# Patient Record
Sex: FEMALE | Race: WHITE | NOT HISPANIC OR LATINO | URBAN - METROPOLITAN AREA
[De-identification: names, ages, dates, MRNs, and addresses within clinical notes are randomized per-mention and may not be internally consistent; named-entity substitution may affect disease eponyms.]

---

## 2020-10-07 ENCOUNTER — TELEPHONE ENCOUNTER (OUTPATIENT)
Dept: URBAN - METROPOLITAN AREA CLINIC 54 | Facility: CLINIC | Age: 85
End: 2020-10-07

## 2020-10-09 ENCOUNTER — WEB ENCOUNTER (OUTPATIENT)
Dept: URBAN - METROPOLITAN AREA CLINIC 54 | Facility: CLINIC | Age: 85
End: 2020-10-09

## 2020-10-09 ENCOUNTER — OFFICE VISIT (OUTPATIENT)
Dept: URBAN - METROPOLITAN AREA CLINIC 54 | Facility: CLINIC | Age: 85
End: 2020-10-09
Payer: MEDICARE

## 2020-10-09 DIAGNOSIS — R93.3 ABNORMAL CT SCAN, COLON: ICD-10-CM

## 2020-10-09 DIAGNOSIS — K92.1 HEMATOCHEZIA: ICD-10-CM

## 2020-10-09 PROCEDURE — G8427 DOCREV CUR MEDS BY ELIG CLIN: HCPCS | Performed by: INTERNAL MEDICINE

## 2020-10-09 PROCEDURE — 1036F TOBACCO NON-USER: CPT | Performed by: INTERNAL MEDICINE

## 2020-10-09 PROCEDURE — G8484 FLU IMMUNIZE NO ADMIN: HCPCS | Performed by: INTERNAL MEDICINE

## 2020-10-09 PROCEDURE — G8420 CALC BMI NORM PARAMETERS: HCPCS | Performed by: INTERNAL MEDICINE

## 2020-10-09 PROCEDURE — 99213 OFFICE O/P EST LOW 20 MIN: CPT | Performed by: INTERNAL MEDICINE

## 2020-10-09 RX ORDER — ATORVASTATIN CALCIUM 20 MG/1
1 TABLET TABLET, FILM COATED ORAL ONCE A DAY
Status: ACTIVE | COMMUNITY

## 2020-10-09 RX ORDER — LEVOTHYROXINE SODIUM 0.07 MG/1
1 TABLET IN THE MORNING ON AN EMPTY STOMACH TABLET ORAL ONCE A DAY
Status: ACTIVE | COMMUNITY

## 2020-10-09 RX ORDER — GABAPENTIN 600 MG/1
1 TABLET TABLET, FILM COATED ORAL ONCE A DAY
Status: ACTIVE | COMMUNITY

## 2020-10-09 RX ORDER — INSULIN GLARGINE 100 [IU]/ML
AS DIRECTED INJECTION, SOLUTION SUBCUTANEOUS
Status: ACTIVE | COMMUNITY

## 2020-10-09 RX ORDER — POLYETHYLENE GLYCOL 3350, SODIUM SULFATE, SODIUM CHLORIDE, POTASSIUM CHLORIDE, ASCORBIC ACID, SODIUM ASCORBATE 140-9-5.2G
AS DIRECTED KIT ORAL
Qty: 1 | OUTPATIENT
Start: 2020-10-09 | End: 2020-10-10

## 2020-10-09 NOTE — HPI-OTHER HISTORIES
>> 10/7 surgery note  85 year old female presents today as an Emergency Room follow-up.   She recently presented to the Emergency Room at [facility] on 10/5/20.  Neither her nor her  are great historians, so history was per patient, her , and review of her medical records.  She presented with the chief complaint of bleeding from her end colostomy over the past several weeks.  CT scan abdomen/pelvis 10/5/20 made note of the following:  "diffuse colitis beginning at the cecum and extending to the ostomy exit site. No abscess or perforation identified... bowel herniation and mesenteric fat extends through the ostomy defect. No evidence of bowel  obstruction or strangulation."  She was told to follow-up with Dr. Martin Cortez, gastroenterology.  No treatment was given/provided.   She has intermittent "discharge" from her colostomy -- she describes mucous and bloody discharge in addition to her stool output.  Her ostomy continues to function.  She has intermittent pain at the site of her actual ostomy (where to ostomy exits the skin).  Otherwise, she denies fever, chills, nausea, vomiting, diffuse abdominal pain, and constipation.  There are no additional specific precipitating, aggravating, or alleviating factors to her symptoms/presentation.   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 Antonio.  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

## 2020-10-09 NOTE — PHYSICAL EXAM GASTROINTESTINAL
Abdomen , soft, nontender, nondistended , no guarding or rigidity , no masses palpable , normal bowel sounds , Liver and Spleen , no hepatomegaly present , no hepatosplenomegaly , liver nontender , spleen not palpable. Flush colostomy with peristomal hernia left abdomen. Scant yellow fluid no blood, ostomy healthy.

## 2020-10-09 NOTE — HPI-TODAY'S VISIT:
This is an 85-year-old female referred by Dr. Diana Antonio for hematochezia via her colostomy.  This is been intermittent for the past month.  She has had some vague abdominal discomfort but describes no pain today.  She believes the pain has reduced somewhat in amount and frequency in the last few days.  She was recently seen at their office and referred here.  She had a sigmoid resection with end colostomy and Molina's pouch several years ago.  Her last colonoscopy in 2016 revealed diverticulosis but was otherwise normal.  The bowels are sometimes somewhat loose. No history of active cardiac disease, chest pain, shortness of breath or coronavirus symptoms. Recent WBC 12.6, Hgb 13.5

## 2020-10-22 ENCOUNTER — OFFICE VISIT (OUTPATIENT)
Dept: URBAN - METROPOLITAN AREA CLINIC 54 | Facility: CLINIC | Age: 85
End: 2020-10-22

## 2020-10-26 ENCOUNTER — OFFICE VISIT (OUTPATIENT)
Dept: URBAN - METROPOLITAN AREA SURGERY CENTER 14 | Facility: SURGERY CENTER | Age: 85
End: 2020-10-26
Payer: MEDICARE

## 2020-10-26 ENCOUNTER — CLAIMS CREATED FROM THE CLAIM WINDOW (OUTPATIENT)
Dept: URBAN - METROPOLITAN AREA CLINIC 4 | Facility: CLINIC | Age: 85
End: 2020-10-26
Payer: MEDICARE

## 2020-10-26 ENCOUNTER — TELEPHONE ENCOUNTER (OUTPATIENT)
Dept: URBAN - METROPOLITAN AREA CLINIC 92 | Facility: CLINIC | Age: 85
End: 2020-10-26

## 2020-10-26 DIAGNOSIS — K92.1 BLACK STOOL: ICD-10-CM

## 2020-10-26 DIAGNOSIS — K51.919 ULCERATIVE COLITIS, UNSPECIFIED WITH UNSPECIFIED COMPLICATIONS: ICD-10-CM

## 2020-10-26 DIAGNOSIS — K51.80 CHRONIC PANCOLONIC ULCERATIVE COLITIS: ICD-10-CM

## 2020-10-26 PROCEDURE — 88342 IMHCHEM/IMCYTCHM 1ST ANTB: CPT | Performed by: PATHOLOGY

## 2020-10-26 PROCEDURE — 88341 IMHCHEM/IMCYTCHM EA ADD ANTB: CPT | Performed by: PATHOLOGY

## 2020-10-26 PROCEDURE — G8907 PT DOC NO EVENTS ON DISCHARG: HCPCS | Performed by: INTERNAL MEDICINE

## 2020-10-26 PROCEDURE — 88305 TISSUE EXAM BY PATHOLOGIST: CPT | Performed by: PATHOLOGY

## 2020-10-26 PROCEDURE — 44389 COLONOSCOPY WITH BIOPSY: CPT | Performed by: INTERNAL MEDICINE

## 2020-10-26 RX ORDER — ATORVASTATIN CALCIUM 20 MG/1
1 TABLET TABLET, FILM COATED ORAL ONCE A DAY
Status: ACTIVE | COMMUNITY

## 2020-10-26 RX ORDER — GABAPENTIN 600 MG/1
1 TABLET TABLET, FILM COATED ORAL ONCE A DAY
Status: ACTIVE | COMMUNITY

## 2020-10-26 RX ORDER — LEVOTHYROXINE SODIUM 0.07 MG/1
1 TABLET IN THE MORNING ON AN EMPTY STOMACH TABLET ORAL ONCE A DAY
Status: ACTIVE | COMMUNITY

## 2020-10-26 RX ORDER — INSULIN GLARGINE 100 [IU]/ML
AS DIRECTED INJECTION, SOLUTION SUBCUTANEOUS
Status: ACTIVE | COMMUNITY

## 2020-10-28 ENCOUNTER — TELEPHONE ENCOUNTER (OUTPATIENT)
Dept: URBAN - METROPOLITAN AREA CLINIC 92 | Facility: CLINIC | Age: 85
End: 2020-10-28

## 2020-10-28 RX ORDER — LEVOTHYROXINE SODIUM 0.07 MG/1
1 TABLET IN THE MORNING ON AN EMPTY STOMACH TABLET ORAL ONCE A DAY
Status: ACTIVE | COMMUNITY

## 2020-10-28 RX ORDER — INSULIN GLARGINE 100 [IU]/ML
AS DIRECTED INJECTION, SOLUTION SUBCUTANEOUS
Status: ACTIVE | COMMUNITY

## 2020-10-28 RX ORDER — GABAPENTIN 600 MG/1
1 TABLET TABLET, FILM COATED ORAL ONCE A DAY
Status: ACTIVE | COMMUNITY

## 2020-10-28 RX ORDER — MESALAMINE 1.2 G/1
2 TABLETS TABLET, DELAYED RELEASE ORAL ONCE A DAY
Qty: 60 TABLET | OUTPATIENT
Start: 2020-10-28 | End: 2020-11-26

## 2020-10-28 RX ORDER — ATORVASTATIN CALCIUM 20 MG/1
1 TABLET TABLET, FILM COATED ORAL ONCE A DAY
Status: ACTIVE | COMMUNITY

## 2020-11-02 ENCOUNTER — TELEPHONE ENCOUNTER (OUTPATIENT)
Dept: URBAN - METROPOLITAN AREA CLINIC 54 | Facility: CLINIC | Age: 85
End: 2020-11-02

## 2020-11-03 ENCOUNTER — TELEPHONE ENCOUNTER (OUTPATIENT)
Dept: URBAN - METROPOLITAN AREA CLINIC 92 | Facility: CLINIC | Age: 85
End: 2020-11-03

## 2020-11-03 ENCOUNTER — OUT OF OFFICE VISIT (OUTPATIENT)
Dept: URBAN - METROPOLITAN AREA MEDICAL CENTER 23 | Facility: MEDICAL CENTER | Age: 85
End: 2020-11-03
Payer: MEDICARE

## 2020-11-03 DIAGNOSIS — R93.3 ABN FINDINGS-GI TRACT: ICD-10-CM

## 2020-11-03 DIAGNOSIS — K94.01 BLEEDING FROM COLOSTOMY: ICD-10-CM

## 2020-11-03 PROBLEM — 10491000119106: Status: ACTIVE | Noted: 2020-11-03

## 2020-11-03 PROCEDURE — 99214 OFFICE O/P EST MOD 30 MIN: CPT | Performed by: INTERNAL MEDICINE

## 2020-11-03 RX ORDER — MESALAMINE 1.2 G/1
2 TABLETS TABLET, DELAYED RELEASE ORAL ONCE A DAY
Qty: 60 TABLET | Status: ACTIVE | COMMUNITY
Start: 2020-10-28 | End: 2020-11-26

## 2020-11-03 RX ORDER — LEVOTHYROXINE SODIUM 0.07 MG/1
1 TABLET IN THE MORNING ON AN EMPTY STOMACH TABLET ORAL ONCE A DAY
Status: ACTIVE | COMMUNITY

## 2020-11-03 RX ORDER — PREDNISONE 10 MG/1
4 TABLETS QAMX 7DAYS, 3 TABS QAM X 7 DAYS, 2 TABS Q AM X 7 DAYS 1 TAB QAM X 7 DAYS TABLET ORAL ONCE A DAY
Qty: 72 | OUTPATIENT
Start: 2020-11-03 | End: 2020-12-01

## 2020-11-03 RX ORDER — ATORVASTATIN CALCIUM 20 MG/1
1 TABLET TABLET, FILM COATED ORAL ONCE A DAY
Status: ACTIVE | COMMUNITY

## 2020-11-03 RX ORDER — INSULIN GLARGINE 100 [IU]/ML
AS DIRECTED INJECTION, SOLUTION SUBCUTANEOUS
Status: ACTIVE | COMMUNITY

## 2020-11-03 RX ORDER — GABAPENTIN 600 MG/1
1 TABLET TABLET, FILM COATED ORAL ONCE A DAY
Status: ACTIVE | COMMUNITY

## 2020-11-03 RX ORDER — SODIUM, POTASSIUM,MAG SULFATES 17.5-3.13G
354ML SOLUTION, RECONSTITUTED, ORAL ORAL
Qty: 354 MILLILITER | Refills: 0 | OUTPATIENT
Start: 2020-11-03 | End: 2020-11-04

## 2020-11-04 ENCOUNTER — TELEPHONE ENCOUNTER (OUTPATIENT)
Dept: URBAN - METROPOLITAN AREA CLINIC 54 | Facility: CLINIC | Age: 85
End: 2020-11-04

## 2020-11-06 ENCOUNTER — OFFICE VISIT (OUTPATIENT)
Dept: URBAN - METROPOLITAN AREA CLINIC 54 | Facility: CLINIC | Age: 85
End: 2020-11-06
Payer: MEDICARE

## 2020-11-06 DIAGNOSIS — K51.90 ULCERATIVE COLITIS: ICD-10-CM

## 2020-11-06 PROBLEM — 64766004 ULCERATIVE COLITIS: Status: ACTIVE | Noted: 2020-10-28

## 2020-11-06 PROCEDURE — G9903 PT SCRN TBCO ID AS NON USER: HCPCS | Performed by: INTERNAL MEDICINE

## 2020-11-06 PROCEDURE — 99213 OFFICE O/P EST LOW 20 MIN: CPT | Performed by: INTERNAL MEDICINE

## 2020-11-06 PROCEDURE — G8427 DOCREV CUR MEDS BY ELIG CLIN: HCPCS | Performed by: INTERNAL MEDICINE

## 2020-11-06 PROCEDURE — G8420 CALC BMI NORM PARAMETERS: HCPCS | Performed by: INTERNAL MEDICINE

## 2020-11-06 PROCEDURE — G8482 FLU IMMUNIZE ORDER/ADMIN: HCPCS | Performed by: INTERNAL MEDICINE

## 2020-11-06 RX ORDER — GABAPENTIN 600 MG/1
1 TABLET TABLET, FILM COATED ORAL ONCE A DAY
Status: ACTIVE | COMMUNITY

## 2020-11-06 RX ORDER — LEVOTHYROXINE SODIUM 0.07 MG/1
1 TABLET IN THE MORNING ON AN EMPTY STOMACH TABLET ORAL ONCE A DAY
Status: ACTIVE | COMMUNITY

## 2020-11-06 RX ORDER — PREDNISONE 10 MG/1
4 TABLETS QAMX 7DAYS, 3 TABS QAM X 7 DAYS, 2 TABS Q AM X 7 DAYS 1 TAB QAM X 7 DAYS TABLET ORAL ONCE A DAY
Qty: 72 | Status: ACTIVE | COMMUNITY
Start: 2020-11-03 | End: 2020-12-01

## 2020-11-06 RX ORDER — INSULIN GLARGINE 100 [IU]/ML
AS DIRECTED INJECTION, SOLUTION SUBCUTANEOUS
Status: ACTIVE | COMMUNITY

## 2020-11-06 RX ORDER — MESALAMINE 1.2 G/1
2 TABLETS TABLET, DELAYED RELEASE ORAL ONCE A DAY
Qty: 60 TABLET | Status: ACTIVE | COMMUNITY
Start: 2020-10-28 | End: 2020-11-26

## 2020-11-06 RX ORDER — ATORVASTATIN CALCIUM 20 MG/1
1 TABLET TABLET, FILM COATED ORAL ONCE A DAY
Status: ACTIVE | COMMUNITY

## 2020-11-06 NOTE — HPI-TODAY'S VISIT:
Follow-up ulcerative colitis with hematochezia.  She has had intermittent continuous bleeding.  A recent CBC was only mildly abnormal.  She was recently overnight in hospital with IV fluids.  She had additional blood last night.  She restarted a few days ago on a steroid taper and Lialda.  There is no abdominal pain.  She otherwise feels well.   >>Prior visit  This is an 85-year-old female referred by Dr. Diana Antonio for hematochezia via her colostomy.  This is been intermittent for the past month.  She has had some vague abdominal discomfort but describes no pain today.  She believes the pain has reduced somewhat in amount and frequency in the last few days.  She was recently seen at their office and referred here.  She had a sigmoid resection with end colostomy and Molina's pouch several years ago.  Her last colonoscopy in 2016 revealed diverticulosis but was otherwise normal.  The bowels are sometimes somewhat loose. No history of active cardiac disease, chest pain, shortness of breath or coronavirus symptoms. Recent WBC 12.6, Hgb 13.5

## 2020-11-06 NOTE — HPI-OTHER HISTORIES
>> 10/7 surgery note  85 year old female presents today as an Emergency Room follow-up.   She recently presented to the Emergency Room at [facility] on 10/5/20.  Neither her nor her  are great historians, so history was per patient, her , and review of her medical records.  She presented with the chief complaint of bleeding from her end colostomy over the past several weeks.  CT scan abdomen/pelvis 10/5/20 made note of the following:  "diffuse colitis beginning at the cecum and extending to the ostomy exit site. No abscess or perforation identified... bowel herniation and mesenteric fat extends through the ostomy defect. No evidence of bowel  obstruction or strangulation."  She was told to follow-up with Dr. Martin Cortez, gastroenterology.  No treatment was given/provided.   She has intermittent "discharge" from her colostomy -- she describes mucous and bloody discharge in addition to her stool output.  Her ostomy continues to function.  She has intermittent pain at the site of her actual ostomy (where to ostomy exits the skin).  Otherwise, she denies fever, chills, nausea, vomiting, diffuse abdominal pain, and constipation.  There are no additional specific precipitating, aggravating, or alleviating factors to her symptoms/presentation.   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 Antonio.  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

## 2020-11-06 NOTE — PHYSICAL EXAM GASTROINTESTINAL
Abdomen , soft, nontender, nondistended , no guarding or rigidity , no masses palpable , normal bowel sounds , Liver and Spleen , no hepatomegaly present , no hepatosplenomegaly , liver nontender , spleen not palpable. Stoma healthy, no blood in bag today.

## 2020-11-07 LAB
HEMATOCRIT: 39.6
HEMOGLOBIN: 13.7
MCH: 30.6
MCHC: 34.6
MCV: 88
NRBC: (no result)
PLATELETS: 234
RBC: 4.48
RDW: 14.3
WBC: 9.6

## 2020-11-12 ENCOUNTER — OFFICE VISIT (OUTPATIENT)
Dept: URBAN - METROPOLITAN AREA CLINIC 54 | Facility: CLINIC | Age: 85
End: 2020-11-12

## 2020-11-13 ENCOUNTER — LAB OUTSIDE AN ENCOUNTER (OUTPATIENT)
Dept: URBAN - METROPOLITAN AREA CLINIC 54 | Facility: CLINIC | Age: 85
End: 2020-11-13

## 2020-11-14 LAB
HEMATOCRIT: 41.4
HEMOGLOBIN: 13.9
MCH: 30.4
MCHC: 33.6
MCV: 91
NRBC: (no result)
PLATELETS: 225
RBC: 4.57
RDW: 14.2
WBC: 8.5

## 2020-11-23 ENCOUNTER — TELEPHONE ENCOUNTER (OUTPATIENT)
Dept: URBAN - METROPOLITAN AREA CLINIC 92 | Facility: CLINIC | Age: 85
End: 2020-11-23

## 2020-11-23 RX ORDER — MESALAMINE 1.2 G/1
2 TABLETS TABLET, DELAYED RELEASE ORAL ONCE A DAY
Qty: 180
Start: 2020-10-28

## 2020-12-02 ENCOUNTER — OFFICE VISIT (OUTPATIENT)
Dept: URBAN - METROPOLITAN AREA CLINIC 54 | Facility: CLINIC | Age: 85
End: 2020-12-02

## 2020-12-09 ENCOUNTER — TELEPHONE ENCOUNTER (OUTPATIENT)
Dept: URBAN - METROPOLITAN AREA CLINIC 54 | Facility: CLINIC | Age: 85
End: 2020-12-09

## 2020-12-10 ENCOUNTER — OFFICE VISIT (OUTPATIENT)
Dept: URBAN - METROPOLITAN AREA CLINIC 54 | Facility: CLINIC | Age: 85
End: 2020-12-10

## 2020-12-21 ENCOUNTER — DASHBOARD ENCOUNTERS (OUTPATIENT)
Age: 85
End: 2020-12-21

## 2020-12-21 ENCOUNTER — TELEPHONE ENCOUNTER (OUTPATIENT)
Dept: URBAN - METROPOLITAN AREA CLINIC 54 | Facility: CLINIC | Age: 85
End: 2020-12-21

## 2020-12-21 RX ORDER — ATORVASTATIN CALCIUM 20 MG/1
1 TABLET TABLET, FILM COATED ORAL ONCE A DAY
COMMUNITY

## 2020-12-21 RX ORDER — MESALAMINE 1.2 G/1
2 TABLETS TABLET, DELAYED RELEASE ORAL ONCE A DAY
Qty: 180 TABLET | Refills: 3

## 2020-12-21 RX ORDER — MESALAMINE 1.2 G/1
2 TABLETS TABLET, DELAYED RELEASE ORAL ONCE A DAY
Qty: 180 | COMMUNITY
Start: 2020-10-28

## 2020-12-21 RX ORDER — LEVOTHYROXINE SODIUM 0.07 MG/1
1 TABLET IN THE MORNING ON AN EMPTY STOMACH TABLET ORAL ONCE A DAY
COMMUNITY

## 2020-12-21 RX ORDER — INSULIN GLARGINE 100 [IU]/ML
AS DIRECTED INJECTION, SOLUTION SUBCUTANEOUS
COMMUNITY

## 2020-12-21 RX ORDER — GABAPENTIN 600 MG/1
1 TABLET TABLET, FILM COATED ORAL ONCE A DAY
COMMUNITY

## 2020-12-23 ENCOUNTER — ERX REFILL RESPONSE (OUTPATIENT)
Dept: URBAN - METROPOLITAN AREA CLINIC 92 | Facility: CLINIC | Age: 85
End: 2020-12-23

## 2020-12-23 ENCOUNTER — OFFICE VISIT (OUTPATIENT)
Dept: URBAN - METROPOLITAN AREA CLINIC 54 | Facility: CLINIC | Age: 85
End: 2020-12-23

## 2020-12-23 RX ORDER — PREDNISONE 10 MG/1
4 TABLETS QAMX 7DAYS, 3 TABS QAM X 7 DAYS, 2 TABS Q AM X 7 DAYS 1 TAB QAM X 7 DAYS TABLET ORAL ONCE A DAY
Qty: 70 | Refills: 0

## 2020-12-29 ENCOUNTER — TELEPHONE ENCOUNTER (OUTPATIENT)
Dept: URBAN - METROPOLITAN AREA CLINIC 54 | Facility: CLINIC | Age: 85
End: 2020-12-29

## 2021-01-06 ENCOUNTER — TELEPHONE ENCOUNTER (OUTPATIENT)
Dept: URBAN - METROPOLITAN AREA CLINIC 54 | Facility: CLINIC | Age: 86
End: 2021-01-06

## 2021-01-08 ENCOUNTER — OFFICE VISIT (OUTPATIENT)
Dept: URBAN - METROPOLITAN AREA CLINIC 54 | Facility: CLINIC | Age: 86
End: 2021-01-08

## 2021-01-11 ENCOUNTER — ERX REFILL RESPONSE (OUTPATIENT)
Dept: URBAN - METROPOLITAN AREA CLINIC 92 | Facility: CLINIC | Age: 86
End: 2021-01-11

## 2021-01-11 RX ORDER — PREDNISONE 10 MG/1
4 TABLETS QAMX 7DAYS, 3 TABS QAM X 7 DAYS, 2 TABS Q AM X 7 DAYS 1 TAB QAM X 7 DAYS TABLET ORAL ONCE A DAY
Qty: 70 | Refills: 0

## 2021-01-27 ENCOUNTER — OFFICE VISIT (OUTPATIENT)
Dept: URBAN - METROPOLITAN AREA CLINIC 54 | Facility: CLINIC | Age: 86
End: 2021-01-27

## 2021-01-27 ENCOUNTER — TELEPHONE ENCOUNTER (OUTPATIENT)
Dept: URBAN - METROPOLITAN AREA CLINIC 54 | Facility: CLINIC | Age: 86
End: 2021-01-27

## 2021-01-27 RX ORDER — INSULIN GLARGINE 100 [IU]/ML
AS DIRECTED INJECTION, SOLUTION SUBCUTANEOUS
COMMUNITY

## 2021-01-27 RX ORDER — PREDNISONE 10 MG/1
4 TABLETS QAMX 7DAYS, 3 TABS QAM X 7 DAYS, 2 TABS Q AM X 7 DAYS 1 TAB QAM X 7 DAYS TABLET ORAL ONCE A DAY
Qty: 70 | Refills: 0 | Status: ACTIVE | COMMUNITY

## 2021-01-27 RX ORDER — MESALAMINE 1.2 G/1
2 TABLETS TABLET, DELAYED RELEASE ORAL ONCE A DAY
Qty: 180 TABLET | Refills: 3 | Status: ACTIVE | COMMUNITY

## 2021-01-27 RX ORDER — LEVOTHYROXINE SODIUM 0.07 MG/1
1 TABLET IN THE MORNING ON AN EMPTY STOMACH TABLET ORAL ONCE A DAY
COMMUNITY

## 2021-01-27 RX ORDER — GABAPENTIN 600 MG/1
1 TABLET TABLET, FILM COATED ORAL ONCE A DAY
COMMUNITY

## 2021-01-27 RX ORDER — ATORVASTATIN CALCIUM 20 MG/1
1 TABLET TABLET, FILM COATED ORAL ONCE A DAY
COMMUNITY

## 2021-02-16 ENCOUNTER — TELEPHONE ENCOUNTER (OUTPATIENT)
Dept: URBAN - METROPOLITAN AREA CLINIC 54 | Facility: CLINIC | Age: 86
End: 2021-02-16

## 2021-02-25 ENCOUNTER — ERX REFILL RESPONSE (OUTPATIENT)
Dept: URBAN - METROPOLITAN AREA CLINIC 54 | Facility: CLINIC | Age: 86
End: 2021-02-25

## 2021-02-25 RX ORDER — MESALAMINE 1.2 G/1
TAKE 2 TABLETS BY MOUTH EVERY DAY TABLET, DELAYED RELEASE ORAL
Qty: 180 TABLET | Refills: 0 | OUTPATIENT

## 2021-02-25 RX ORDER — MESALAMINE 1.2 G/1
2 TABLETS TABLET, DELAYED RELEASE ORAL ONCE A DAY
Qty: 180 | Refills: 0 | OUTPATIENT

## 2021-02-25 RX ORDER — MESALAMINE 1.2 G/1
2 TABLETS TABLET, DELAYED RELEASE ORAL ONCE A DAY
Qty: 180 | Refills: 0